# Patient Record
Sex: MALE | Race: ASIAN | NOT HISPANIC OR LATINO | Employment: STUDENT | URBAN - METROPOLITAN AREA
[De-identification: names, ages, dates, MRNs, and addresses within clinical notes are randomized per-mention and may not be internally consistent; named-entity substitution may affect disease eponyms.]

---
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
## 2018-01-24 ENCOUNTER — HOSPITAL ENCOUNTER (OUTPATIENT)
Dept: RADIOLOGY | Facility: HOSPITAL | Age: 18
Discharge: HOME | End: 2018-01-24
Attending: ORTHOPAEDIC SURGERY
Payer: MEDICAID

## 2018-01-24 ENCOUNTER — CLINICAL SUPPORT (OUTPATIENT)
Dept: REHABILITATION | Facility: HOSPITAL | Age: 18
End: 2018-01-24
Attending: PHYSICIAN ASSISTANT
Payer: COMMERCIAL

## 2018-01-24 ENCOUNTER — OFFICE VISIT (OUTPATIENT)
Dept: SPORTS MEDICINE | Facility: CLINIC | Age: 18
End: 2018-01-24
Payer: MEDICAID

## 2018-01-24 VITALS
WEIGHT: 140 LBS | HEIGHT: 62 IN | HEART RATE: 95 BPM | DIASTOLIC BLOOD PRESSURE: 95 MMHG | BODY MASS INDEX: 25.76 KG/M2 | SYSTOLIC BLOOD PRESSURE: 149 MMHG

## 2018-01-24 DIAGNOSIS — Z98.890 STATUS POST ARTHROSCOPY OF HIP: ICD-10-CM

## 2018-01-24 DIAGNOSIS — Z98.890 STATUS POST ARTHROSCOPY OF HIP: Primary | ICD-10-CM

## 2018-01-24 DIAGNOSIS — R29.898 WEAKNESS OF HIP, UNSPECIFIED LATERALITY: ICD-10-CM

## 2018-01-24 DIAGNOSIS — M25.551 RIGHT HIP PAIN: ICD-10-CM

## 2018-01-24 PROCEDURE — 73503 X-RAY EXAM HIP UNI 4/> VIEWS: CPT | Mod: TC,PO,LT

## 2018-01-24 PROCEDURE — 99213 OFFICE O/P EST LOW 20 MIN: CPT | Mod: PBBFAC,25,PO | Performed by: ORTHOPAEDIC SURGERY

## 2018-01-24 PROCEDURE — 99999 PR PBB SHADOW E&M-EST. PATIENT-LVL III: CPT | Mod: PBBFAC,,, | Performed by: ORTHOPAEDIC SURGERY

## 2018-01-24 PROCEDURE — 97110 THERAPEUTIC EXERCISES: CPT

## 2018-01-24 PROCEDURE — 99024 POSTOP FOLLOW-UP VISIT: CPT | Mod: ,,, | Performed by: ORTHOPAEDIC SURGERY

## 2018-01-24 PROCEDURE — 97140 MANUAL THERAPY 1/> REGIONS: CPT

## 2018-01-24 PROCEDURE — 73503 X-RAY EXAM HIP UNI 4/> VIEWS: CPT | Mod: 26,LT,, | Performed by: RADIOLOGY

## 2018-01-24 NOTE — PROGRESS NOTES
CC Right hip pain    HISTORY OF PRESENT ILLNESS:   Pt is here today for post-operative followup of his hip arthroscopy.  he is doing well.  We have reviewed his findings and discussed plan of care and future treatment options.    Pain today 2/10  He has been attending physical therapy at the [facility] location with Patti               He went on vacation for 2.5 weeks and missed Therapy and was limping when he returned   His mother notes him walking with a limp at times   He notes that he has been doing his HEP every other day     DATE OF PROCEDURE: 10/26/2017  PROCEDURE:    Right:  1. Hip arthroscopic labral debridement (CPT 64794)  2. Hip arthroscopic femoral neck osteoplasty (CPT 04976)  3. Hip arthroscopic partial synovectomy/debridement (CPT 56259)  4. Hip arthroscopic chondroplasty (CPT 13367)  5. Hip arthroscopic capsular closure  6. Hip arthroscopic-assisted arthrocentesis of viable structural human allograft tissue      matrix (BioDRestore, supralabral recess) (CPT 32911)  7. 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                                                                     PHYSICAL EXAMINATION:     Incision sites healed well  No evidence of any erythema, infection or induration  Flexion ROM 0-95 degrees   IR without axial load:  30  IR with axial load: 25  ER: 35  abd: 35  Add: 15  Minimal effusion  2+ DP pulse  No swelling, no calf tenderness    + tenderness, hip flexor and greater troch                                                                                ASSESSMENT:                                                                                                                                                1. Status post above, doing well.                                                                                                                               PLAN:                                                                                                                                                     1. Continue with PT  2. Emphasized core function.  3. I have discussed return to activity in detail.  4. he will see us back in 3 months with hip form.  5. All questions were answered and he should contact us if he  has any questions or concerns in the interim.

## 2018-01-24 NOTE — PROGRESS NOTES
Physical Therapy Daily Note     Date: 01/24/2018  Name: Tito Stephens  Clinic Number: [id number]  Diagnosis:   Encounter Diagnoses   Name Primary?    Right hip pain     Weakness of hip, unspecified laterality      Physician: David Lew III, *    Evaluation Date: 10/27  Date of Surgery: 10/26  Post op day 98  Return to MD Date:   Visit #: 8  Start Time:  205  Stop Time:  300  Total Treatment Time: 55     Precautions: hip labral repair  Subjective     Pt has not been to PT in 1 month. He was out of town. Pt saw MD and MD would like additional strengthening and stretching  Pain: 3/10    Objective     Measurements taken:     Tito was instructed in and performed therapeutic exercises to develop strength, endurance, ROM, flexibility, posture and core stabilization for 30  minutes including:     Clamshells 3 x 10  kickouts 3 x 10  SL bridges 3 x 10  Walkout x 2 laps   Wall sit 3:30 sec    NP  Side bridge 3 x 10   SKTC 5:30sec B    Tito received the following manual therapy techniques: Joint mobilizations and Soft tissue Mobilization were applied to the: for 10 minutes.   OP hip flexor stretch   LLD  Lateral distraction with belt  PROM with mobilization  Flex  IR  abd  Flex + IR    Pt received CP to R hip x 10 min    Written Home Exercises Provided: Yes  Pt demo good understanding of the education provided. Tito demonstrated good return demonstration of activities.     Education provided:  Tito verbalized good understanding of education provided.   No spiritual or educational barriers to learning identified.    Assessment     Pt demonstrated tightness with PROM, but reports that this is significantly better than pre-surgery ROM. Pt will benefit from cont hip and quad strengthening in addition to stretching global hip musculature    This is a 17 y.o. male referred to outpatient physical therapy and presents with a medical diagnosis of s/p hip l abral  repair and demonstrates limitations as described in the problem list Pt prognosis is Excellent. Pt will continue to benefit from skilled outpatient physical therapy to address the deficits listed below in the problem list, provide pt/family education and to maximize pt's level of independence in the home and community environment.    Will the patient continue to benefit from skilled PT intervention? Yes        Medical necessity is demonstrated by:   - Pain limits function of effected part for all activities  - Unable to participate in daily activities   - Requires skilled supervision to complete and progress HEP  - Fall risk - impaired balance   - Continued inability to participate in vocational pursuits    Progress towards goals:     New/Revised Goals:       Plan   Continue with established Plan of Care towards PT goals.      Aissatou Madison, PT, DPT  01/24/2018

## 2018-02-01 ENCOUNTER — CLINICAL SUPPORT (OUTPATIENT)
Dept: REHABILITATION | Facility: HOSPITAL | Age: 18
End: 2018-02-01
Attending: PHYSICIAN ASSISTANT
Payer: COMMERCIAL

## 2018-02-01 DIAGNOSIS — M25.551 RIGHT HIP PAIN: ICD-10-CM

## 2018-02-01 DIAGNOSIS — R29.898 WEAKNESS OF HIP, UNSPECIFIED LATERALITY: ICD-10-CM

## 2018-02-01 PROCEDURE — 97140 MANUAL THERAPY 1/> REGIONS: CPT

## 2018-02-01 PROCEDURE — 97110 THERAPEUTIC EXERCISES: CPT

## 2018-02-01 NOTE — PROGRESS NOTES
"                                                  Physical Therapy Daily Note     Date: 02/01/2018  Name: Tito Stephens  Clinic Number: [id number]  Diagnosis:   Encounter Diagnoses   Name Primary?    Right hip pain     Weakness of hip, unspecified laterality      Physician: David Lew III, *    Evaluation Date: 10/27  Date of Surgery: 10/26  Post op day 98  Return to MD Date:   Visit #: 9  Start Time: 3:30  Stop Time:  4:35   Total Treatment Time: 65     Precautions: hip labral repair  Subjective     Pt compliant with HEP, states increased stiffness today.   Pain: 1/10    Objective     Measurements taken:     Tito was instructed in and performed therapeutic exercises to develop strength, endurance, ROM, flexibility, posture and core stabilization for 55  minutes including:     Clamshells BTB x15, x15 with hip ext knee bent  kickouts 3 x 10 BTB  SL bridges 3 x 10 NT  Walkout x 2 laps BTB  Wall sit 3:30 sec  R hip flex stretch EOM 3x30"  Piriformis Stretch 3x30"  Hamstring Stretch Standing foot on step 3x30"  Bridge on SB 2x10       With Ham Curl 2x10  Shuttle DL 80# 3x10  Shuttle SL 60# 3x10  Quad Alt LE/UE 3x10     NP  Side bridge 3 x 10   SKTC 5:30sec B    Tito received the following manual therapy techniques: Joint mobilizations and Soft tissue Mobilization were applied to the: for 10 minutes.   OP hip flexor stretch   LLD  Lateral distraction with belt  PROM with mobilization  Flex  IR  abd  Flex + IR    Pt received CP to R hip x 10 min    Written Home Exercises Provided: Yes  Pt demo good understanding of the education provided. Tito demonstrated good return demonstration of activities.     Education provided:  Tito verbalized good understanding of education provided.   No spiritual or educational barriers to learning identified.    Assessment     Pt tolerated tx with moderate fatigue by end of tx, primarily in glut and hamstrings. Pt with improved hip mobility after manual tx - noted " primary limitations in Hip Ext and IR . Will continue to progress quad and hip strengthening as tolerated.      This is a 17 y.o. male referred to outpatient physical therapy and presents with a medical diagnosis of s/p hip l abral repair and demonstrates limitations as described in the problem list Pt prognosis is Excellent. Pt will continue to benefit from skilled outpatient physical therapy to address the deficits listed below in the problem list, provide pt/family education and to maximize pt's level of independence in the home and community environment.    Will the patient continue to benefit from skilled PT intervention? Yes        Medical necessity is demonstrated by:   - Pain limits function of effected part for all activities  - Unable to participate in daily activities   - Requires skilled supervision to complete and progress HEP  - Fall risk - impaired balance   - Continued inability to participate in vocational pursuits    Progress towards goals:     New/Revised Goals:       Plan   Continue with established Plan of Care towards PT goals.      Aissatou Madison, PT, DPT  02/01/2018

## 2018-02-08 ENCOUNTER — CLINICAL SUPPORT (OUTPATIENT)
Dept: REHABILITATION | Facility: HOSPITAL | Age: 18
End: 2018-02-08
Attending: PHYSICIAN ASSISTANT
Payer: COMMERCIAL

## 2018-02-08 DIAGNOSIS — M25.551 RIGHT HIP PAIN: ICD-10-CM

## 2018-02-08 DIAGNOSIS — R29.898 WEAKNESS OF HIP, UNSPECIFIED LATERALITY: ICD-10-CM

## 2018-02-08 PROCEDURE — 97110 THERAPEUTIC EXERCISES: CPT

## 2018-02-08 NOTE — PROGRESS NOTES
"                                                  Physical Therapy Daily Note     Date: 02/08/2018  Name: Tito Stephens  Clinic Number: [id number]  Diagnosis:   No diagnosis found.  Physician: David Lew III, *    Evaluation Date: 10/27  Date of Surgery: 10/26  Post op day 98  Return to MD Date:   Visit #: 9  Start Time: 3:30  Stop Time:  4:35   Total Treatment Time: 65     Precautions: hip labral repair  Subjective     Pt compliant with HEP, states increased stiffness today.   Pain: 1/10    Objective     Measurements taken:     Tito was instructed in and performed therapeutic exercises to develop strength, endurance, ROM, flexibility, posture and core stabilization for 55  minutes including:     Clamshells BTB x15, x15 with hip ext knee bent  kickouts 3 x 10 BTB  SL bridges 3 x 10 NT  Walkout x 2 laps BTB  Wall sit 3:30 sec  R hip flex stretch EOM 3x30"  Piriformis Stretch 3x30"  Hamstring Stretch Standing foot on step 3x30"  Bridge on SB 2x10       With Ham Curl 2x10  Shuttle DL 80# 3x10  Shuttle SL 60# 3x10  Quad Alt LE/UE 3x10     NP  Side bridge 3 x 10   SKTC 5:30sec B    Tito received the following manual therapy techniques: Joint mobilizations and Soft tissue Mobilization were applied to the: for 10 minutes.   OP hip flexor stretch   LLD  Lateral distraction with belt  PROM with mobilization  Flex  IR  abd  Flex + IR    Pt received CP to R hip x 10 min    Written Home Exercises Provided: Yes  Pt demo good understanding of the education provided. Tito demonstrated good return demonstration of activities.     Education provided:  Tito verbalized good understanding of education provided.   No spiritual or educational barriers to learning identified.    Assessment     Pt tolerated tx with moderate fatigue by end of tx, primarily in glut and hamstrings. Pt with improved hip mobility after manual tx - noted primary limitations in Hip Ext and IR . Will continue to progress quad and hip " strengthening as tolerated.      This is a 17 y.o. male referred to outpatient physical therapy and presents with a medical diagnosis of s/p hip l abral repair and demonstrates limitations as described in the problem list Pt prognosis is Excellent. Pt will continue to benefit from skilled outpatient physical therapy to address the deficits listed below in the problem list, provide pt/family education and to maximize pt's level of independence in the home and community environment.    Will the patient continue to benefit from skilled PT intervention? Yes        Medical necessity is demonstrated by:   - Pain limits function of effected part for all activities  - Unable to participate in daily activities   - Requires skilled supervision to complete and progress HEP  - Fall risk - impaired balance   - Continued inability to participate in vocational pursuits    Progress towards goals:     New/Revised Goals:       Plan   Continue with established Plan of Care towards PT goals.      Aissatou Madison, PT, DPT  02/08/2018

## 2018-02-08 NOTE — PROGRESS NOTES
"                                                  Physical Therapy Daily Note     Date: 02/08/2018  Name: Tito Stephens  Clinic Number: [id number]  Diagnosis:   Encounter Diagnoses   Name Primary?    Right hip pain     Weakness of hip, unspecified laterality      Physician: David Lew III, *    Evaluation Date: 10/27  Date of Surgery: 10/26  Post op day 98  Return to MD Date:   Visit #: 9  Start Time: 300  Stop Time:  4:00  Total Treatment Time: 60     Precautions: hip labral repair  Subjective     Pt reporting stiffness in the R hip but has no pain. .   Pain: 1/10    Objective     Measurements taken:     Tito was instructed in and performed therapeutic exercises to develop strength, endurance, ROM, flexibility, posture and core stabilization for 55  minutes including:     Clamshells BTB x15, x15 with hip ext knee bent  kickouts 3 x 10 BTB  SL bridges 3 x 10   Wall sit 3:30 sec  R hip flex stretch EOM 4 min  Piriformis Stretch 3x30"  Hamstring Stretch Standing foot on step 3x30"  Bridge on SB 2x10       With Ham Curl 2x10  Quad Alt LE/UE on stabaility ball 3x10   Squat rockers 15# x 30      NP  Side bridge 3 x 10   SKTC 5:30sec B    Tito received the following manual therapy techniques: Joint mobilizations and Soft tissue Mobilization were applied to the: for 10 minutes.   OP hip flexor stretch   LLD  Lateral distraction with belt  PROM with mobilization  Flex  IR  abd  Flex + IR    Pt received CP to R hip x 10 min- NP    Written Home Exercises Provided: Yes  Pt demo good understanding of the education provided. Tito demonstrated good return demonstration of activities.     Education provided:  Tito verbalized good understanding of education provided.   No spiritual or educational barriers to learning identified.    Assessment     Pt did well with therapy today noting fatigue with exercise activity. Appeared to have a really good response to a side lying anterior fascial chain stretch " allowing improved hip extension. Gross hip mobility remains stiff but does improve throughout the tx session.     This is a 17 y.o. male referred to outpatient physical therapy and presents with a medical diagnosis of s/p hip l abral repair and demonstrates limitations as described in the problem list Pt prognosis is Excellent. Pt will continue to benefit from skilled outpatient physical therapy to address the deficits listed below in the problem list, provide pt/family education and to maximize pt's level of independence in the home and community environment.    Will the patient continue to benefit from skilled PT intervention? Yes        Medical necessity is demonstrated by:   - Pain limits function of effected part for all activities  - Unable to participate in daily activities   - Requires skilled supervision to complete and progress HEP  - Fall risk - impaired balance   - Continued inability to participate in vocational pursuits    Progress towards goals:     New/Revised Goals:       Plan   Continue with established Plan of Care towards PT goals.      Speedy Anderson, PT , DPT, OCS  02/08/2018

## 2018-02-14 ENCOUNTER — CLINICAL SUPPORT (OUTPATIENT)
Dept: REHABILITATION | Facility: HOSPITAL | Age: 18
End: 2018-02-14
Attending: PHYSICIAN ASSISTANT
Payer: COMMERCIAL

## 2018-02-14 DIAGNOSIS — R29.898 WEAKNESS OF HIP, UNSPECIFIED LATERALITY: ICD-10-CM

## 2018-02-14 DIAGNOSIS — M25.551 RIGHT HIP PAIN: ICD-10-CM

## 2018-02-14 PROCEDURE — 97110 THERAPEUTIC EXERCISES: CPT

## 2018-02-14 NOTE — PROGRESS NOTES
"                                                  Physical Therapy Daily Note     Date: 02/14/2018  Name: Tito Stephens  Clinic Number: [id number]  Diagnosis:   Encounter Diagnoses   Name Primary?    Right hip pain     Weakness of hip, unspecified laterality      Physician: David Lew III, *    Evaluation Date: 10/27  Date of Surgery: 10/26  Post op day 105  Return to MD Date:   Visit #: 10  Start Time: 1120  Stop Time:  1210  Total Treatment Time: 50     Precautions: hip labral repair  Subjective     Pt reports that his hip cont to feel tight, but he denies pain. He reports that he feels it has improved overall.   Pain: 1/10    Objective     Measurements taken:     Tito was instructed in and performed therapeutic exercises to develop strength, endurance, ROM, flexibility, posture and core stabilization for 55  minutes including:     Clamshells BTB x15, x15 with hip ext knee bent  kickouts 3 x 10 BTB  SL bridges 3 x 10   Wall sit 3:30 sec  Piriformis Stretch 3x30"  Hamstring Stretch Standing foot on step 3x30"  EOT lunges 3 x 10 per side  Leg press 3 x 10 DL, SL        Tito received the following manual therapy techniques: Joint mobilizations and Soft tissue Mobilization were applied to the: for 10 minutes.   OP hip flexor stretch   LLD  Lateral distraction with belt  PROM with mobilization  RYAN    Written Home Exercises Provided: Yes  Pt demo good understanding of the education provided. Tito demonstrated good return demonstration of activities.     Education provided:  Tito verbalized good understanding of education provided.   No spiritual or educational barriers to learning identified.    Assessment     Pt cont with stiffness, but had improved ROM following tx.  Improving global hip strength and function. Cont to progress mobility and functional strengthening.     This is a 17 y.o. male referred to outpatient physical therapy and presents with a medical diagnosis of s/p hip l abral " repair and demonstrates limitations as described in the problem list Pt prognosis is Excellent. Pt will continue to benefit from skilled outpatient physical therapy to address the deficits listed below in the problem list, provide pt/family education and to maximize pt's level of independence in the home and community environment.    Will the patient continue to benefit from skilled PT intervention? Yes        Medical necessity is demonstrated by:   - Pain limits function of effected part for all activities  - Unable to participate in daily activities   - Requires skilled supervision to complete and progress HEP  - Fall risk - impaired balance   - Continued inability to participate in vocational pursuits    Progress towards goals:     New/Revised Goals:       Plan   Continue with established Plan of Care towards PT goals.    Aissatou Madison, PT, DPT  02/14/2018

## 2018-02-22 ENCOUNTER — CLINICAL SUPPORT (OUTPATIENT)
Dept: REHABILITATION | Facility: HOSPITAL | Age: 18
End: 2018-02-22
Attending: PHYSICIAN ASSISTANT
Payer: COMMERCIAL

## 2018-02-22 DIAGNOSIS — M25.551 RIGHT HIP PAIN: ICD-10-CM

## 2018-02-22 DIAGNOSIS — R29.898 WEAKNESS OF RIGHT HIP: ICD-10-CM

## 2018-02-22 PROCEDURE — 97110 THERAPEUTIC EXERCISES: CPT

## 2018-02-22 PROCEDURE — 97140 MANUAL THERAPY 1/> REGIONS: CPT

## 2018-02-27 ENCOUNTER — CLINICAL SUPPORT (OUTPATIENT)
Dept: REHABILITATION | Facility: HOSPITAL | Age: 18
End: 2018-02-27
Attending: PHYSICIAN ASSISTANT
Payer: COMMERCIAL

## 2018-02-27 DIAGNOSIS — R29.898 WEAKNESS OF HIP, UNSPECIFIED LATERALITY: ICD-10-CM

## 2018-02-27 DIAGNOSIS — M25.551 RIGHT HIP PAIN: ICD-10-CM

## 2018-02-27 PROCEDURE — 97110 THERAPEUTIC EXERCISES: CPT

## 2018-02-27 PROCEDURE — 97140 MANUAL THERAPY 1/> REGIONS: CPT

## 2018-02-27 NOTE — PROGRESS NOTES
"                                                  Physical Therapy Daily Note     Date: 02/27/2018  Name: Tito Stephens  Clinic Number: [id number]  Diagnosis:   Encounter Diagnoses   Name Primary?    Right hip pain     Weakness of right hip      Physician: David Lew III, *    Evaluation Date: 10/27  Date of Surgery: 10/26  Post op day 105  Return to MD Date:   Visit #: 10   Precautions: hip labral repair  Subjective     Pt reports feeling fine without pain today.     Objective     Measurements taken:     Tito was instructed in and performed therapeutic exercises to develop strength, endurance, ROM, flexibility, posture and core stabilization including:     Clamshells BTB x15, x15 with hip ext knee bent  kickouts 3 x 10 BTB  SL bridges 3 x 10   Wall sit 3:30 sec  Piriformis Stretch 3x30"  Hamstring Stretch Standing foot on step 3x30"  EOT lunges 3 x 10 per side  Leg press 3 x 10 DL, SL    Tito received the following manual therapy techniques: Joint mobilizations and Soft tissue Mobilization were applied to the: for 10 minutes.   OP hip flexor stretch   LLD  Lateral distraction with belt  PROM with mobilization  RYAN    Written Home Exercises Provided: Yes  Pt demo good understanding of the education provided. Tito demonstrated good return demonstration of activities.     Education provided:  Tito verbalized good understanding of education provided.   No spiritual or educational barriers to learning identified.    Assessment     Pt cont with stiffness, but had improved ROM following tx.  Improving global hip strength and function. Cont to progress mobility and functional strengthening.   This is a 17 y.o. male referred to outpatient physical therapy and presents with a medical diagnosis of s/p hip l abral repair and demonstrates limitations as described in the problem list Pt prognosis is Excellent. Pt will continue to benefit from skilled outpatient physical therapy to address the deficits " listed below in the problem list, provide pt/family education and to maximize pt's level of independence in the home and community environment.    Will the patient continue to benefit from skilled PT intervention? Yes        Medical necessity is demonstrated by:   - Pain limits function of effected part for all activities  - Unable to participate in daily activities   - Requires skilled supervision to complete and progress HEP  - Fall risk - impaired balance   - Continued inability to participate in vocational pursuits    Progress towards goals:     New/Revised Goals:       Plan   Continue with established Plan of Care towards PT goals. PT/PTA face-to-fac  Lawanda Hollins, PTA  Aissatou Madison, PT, DPT

## 2018-02-27 NOTE — PROGRESS NOTES
"                                                  Physical Therapy Daily Note     Date: 02/27/2018  Name: Tito Stephens  Clinic Number: [id number]  Diagnosis:   Encounter Diagnoses   Name Primary?    Right hip pain     Weakness of hip, unspecified laterality      Physician: David Lew III, *    Evaluation Date: 10/27  Date of Surgery: 10/26  Post op day 105  Return to MD Date:   Visit #: 11  Start Time: 1110  Stop Time:  1200  Total Treatment Time: 50     Precautions: hip labral repair  Subjective     Pt reports that his hip cont to feel tight, but he denies pain. He reports that he feels it has improved overall.   Pain: 1/10    Objective     Measurements taken:     Tito was instructed in and performed therapeutic exercises to develop strength, endurance, ROM, flexibility, posture and core stabilization for 40  minutes including:     Elliptical 6'  Clamshells with active hip extension, knee bent OTB 3x10   kickouts 3 x 10 BTB  SL bridges 3 x 10   Wall sit 3:30 sec  Piriformis Stretch 3x30"  Hamstring Stretch Standing foot on step 3x30"  BOSU lunges 3 x 10 per side  Leg press 3 x 10 #, SL 70#  Bridge on SB with ham curl 3x10    Tito received the following manual therapy techniques: Joint mobilizations and Soft tissue Mobilization were applied to the: for 10 minutes.   Hip mobilization with belt  PROM     Written Home Exercises Provided: Yes  Pt demo good understanding of the education provided. Tito demonstrated good return demonstration of activities.     Education provided:  Tito verbalized good understanding of education provided.   No spiritual or educational barriers to learning identified.    Assessment     Pt cont with stiffness, but had improved ROM following tx.  Improving global hip strength and function. Cont to progress mobility and functional strengthening.     This is a 17 y.o. male referred to outpatient physical therapy and presents with a medical diagnosis of s/p hip l " abral repair and demonstrates limitations as described in the problem list Pt prognosis is Excellent. Pt will continue to benefit from skilled outpatient physical therapy to address the deficits listed below in the problem list, provide pt/family education and to maximize pt's level of independence in the home and community environment.    Will the patient continue to benefit from skilled PT intervention? Yes        Medical necessity is demonstrated by:   - Pain limits function of effected part for all activities  - Unable to participate in daily activities   - Requires skilled supervision to complete and progress HEP  - Fall risk - impaired balance   - Continued inability to participate in vocational pursuits    Progress towards goals:     New/Revised Goals:       Plan   Continue with established Plan of Care towards PT goals.    Aissatou Madison, PT, DPT  02/27/2018

## 2018-03-08 ENCOUNTER — CLINICAL SUPPORT (OUTPATIENT)
Dept: REHABILITATION | Facility: HOSPITAL | Age: 18
End: 2018-03-08
Attending: PHYSICIAN ASSISTANT
Payer: COMMERCIAL

## 2018-03-08 DIAGNOSIS — M25.551 RIGHT HIP PAIN: ICD-10-CM

## 2018-03-08 DIAGNOSIS — R29.898 WEAKNESS OF RIGHT HIP: ICD-10-CM

## 2018-03-08 PROCEDURE — 97140 MANUAL THERAPY 1/> REGIONS: CPT

## 2018-03-08 PROCEDURE — 97110 THERAPEUTIC EXERCISES: CPT

## 2018-03-08 NOTE — PROGRESS NOTES
"                                                  Physical Therapy Daily Note     Date: 03/11/2018  Name: Tito Stephens  Clinic Number: [id number]  Diagnosis:   Encounter Diagnoses   Name Primary?    Right hip pain     Weakness of right hip      Physician: David Lew III, *    Evaluation Date: 10/27  Date of Surgery: 10/26  Post op day 105  Return to MD Date:   Visit #: 14  Start Time: 3:05 p  Stop Time: 4:00 p  Total Treatment Time: 55     Precautions: hip labral repair  Subjective     Pt reports that his hip cont to feel tight, but he denies pain. He reports that he feels it has improved overall.   Pain: 1/10    Objective     Measurements taken:     Tito was instructed in and performed therapeutic exercises to develop strength, endurance, ROM, flexibility, posture and core stabilization for 40  minutes including:     Elliptical 6'  Clamshells with active hip extension, knee bent OTB 3x10   kickouts 3 x 10 BTB NT  SL bridges 3 x 10 BTB  Wall sit 3:30 sec NT  Piriformis Stretch 3x30"  Hamstring Stretch Standing foot on step 3x30"  BOSU lunges 3 x 10 per side  Leg press 3 x 10 #, SL 70# NT  Bridge on SB with ham curl 3x10  Pilates Box 2x10 R  SLS Rebounder 3x10 on blue foam pad    Tito received the following manual therapy techniques: Joint mobilizations and Soft tissue Mobilization were applied to the: for 10 minutes.   Hip mobilization with belt  PROM     Written Home Exercises Provided: Yes  Pt demo good understanding of the education provided. Tito demonstrated good return demonstration of activities.     Education provided:  Tito verbalized good understanding of education provided.   No spiritual or educational barriers to learning identified.    Assessment     Pt presents tired and not motivated today prior to starting therapy. Removed leg press and wall sits today due to increased fatigue. Pt with improvement in PROM - still noted restrictions but overall WFL. Pt strength is " still limited on R side, will address POC next visit.     This is a 17 y.o. male referred to outpatient physical therapy and presents with a medical diagnosis of s/p hip l abral repair and demonstrates limitations as described in the problem list Pt prognosis is Excellent. Pt will continue to benefit from skilled outpatient physical therapy to address the deficits listed below in the problem list, provide pt/family education and to maximize pt's level of independence in the home and community environment.    Will the patient continue to benefit from skilled PT intervention? Yes        Medical necessity is demonstrated by:   - Pain limits function of effected part for all activities  - Unable to participate in daily activities   - Requires skilled supervision to complete and progress HEP  - Fall risk - impaired balance   - Continued inability to participate in vocational pursuits    Progress towards goals:     New/Revised Goals:       Plan   Continue with established Plan of Care towards PT goals.    Aissatou Madison, PT, DPT  03/11/2018

## 2018-04-04 ENCOUNTER — CLINICAL SUPPORT (OUTPATIENT)
Dept: REHABILITATION | Facility: HOSPITAL | Age: 18
End: 2018-04-04
Attending: PHYSICIAN ASSISTANT
Payer: COMMERCIAL

## 2018-04-04 DIAGNOSIS — R29.898 WEAKNESS OF RIGHT HIP: ICD-10-CM

## 2018-04-04 DIAGNOSIS — M25.551 RIGHT HIP PAIN: ICD-10-CM

## 2018-04-04 PROCEDURE — 97110 THERAPEUTIC EXERCISES: CPT

## 2018-04-04 NOTE — PROGRESS NOTES
"                                                  Physical Therapy Daily Note     Date: 04/04/2018  Name: Tito Stephens  Clinic Number: [id number]  Diagnosis:   Encounter Diagnoses   Name Primary?    Right hip pain     Weakness of right hip      Physician: David Lew III, *    Evaluation Date: 10/27  Date of Surgery: 10/26  Return to MD Date:   Visit #: 15  Start Time: 700  Stop Time: 730  Total Treatment Time: 30     Precautions: hip labral repair  Subjective     Pt has not attended PT in 1 month. Inconsistent with appointments and has not had continuous therapy.   Pt has flight this AM and can only stay for 30 min.  Pain: 0/10    Objective     Measurements taken:     Hip abd 3/5  Hip flex 3/5  Hip flexx ROM 95    Tito was instructed in and performed therapeutic exercises to develop strength, endurance, ROM, flexibility, posture and core stabilization for 30  minutes including:       Clamshells with active hip extension, knee bent OTB 3x10   kickouts 3 x 10 BTB NT  SL bridges 3 x 10 BTB  Prone quad stretch 5:30 sec per side      NP  Wall sit 3:30 sec NT  Piriformis Stretch 3x30"  Hamstring Stretch Standing foot on step 3x30"  BOSU lunges 3 x 10 per side  Leg press 3 x 10 #, SL 70# NT  Bridge on SB with ham curl 3x10  Pilates Box 2x10 R  SLS Rebounder 3x10 on blue foam pad    Tito received the following manual therapy techniques: Joint mobilizations and Soft tissue Mobilization were applied to the: for 0 minutes.   Hip mobilization with belt  PROM     Written Home Exercises Provided: Yes  Pt demo good understanding of the education provided. Tito demonstrated good return demonstration of activities.     Education provided:  Tito verbalized good understanding of education provided.   No spiritual or educational barriers to learning identified.    Assessment     Pt encouraged to increase PT visits and perform additional home program. He reports that he has not kept up with therex at " home. Pt given stretches to perform while out of town.      This is a 17 y.o. male referred to outpatient physical therapy and presents with a medical diagnosis of s/p hip l abral repair and demonstrates limitations as described in the problem list Pt prognosis is Excellent. Pt will continue to benefit from skilled outpatient physical therapy to address the deficits listed below in the problem list, provide pt/family education and to maximize pt's level of independence in the home and community environment.    Will the patient continue to benefit from skilled PT intervention? Yes        Medical necessity is demonstrated by:   - Pain limits function of effected part for all activities  - Unable to participate in daily activities   - Requires skilled supervision to complete and progress HEP  - Fall risk - impaired balance   - Continued inability to participate in vocational pursuits    Progress towards goals:     New/Revised Goals:       Plan   Continue with established Plan of Care towards PT goals.    Aissatou Madison, PT, DPT  04/04/2018

## 2018-04-20 ENCOUNTER — OFFICE VISIT (OUTPATIENT)
Dept: SPORTS MEDICINE | Facility: CLINIC | Age: 18
End: 2018-04-20
Payer: COMMERCIAL

## 2018-04-20 ENCOUNTER — HOSPITAL ENCOUNTER (OUTPATIENT)
Dept: RADIOLOGY | Facility: HOSPITAL | Age: 18
Discharge: HOME | End: 2018-04-20
Attending: ORTHOPAEDIC SURGERY
Payer: COMMERCIAL

## 2018-04-20 VITALS
HEART RATE: 83 BPM | DIASTOLIC BLOOD PRESSURE: 96 MMHG | BODY MASS INDEX: 25.76 KG/M2 | SYSTOLIC BLOOD PRESSURE: 151 MMHG | WEIGHT: 140 LBS | HEIGHT: 62 IN

## 2018-04-20 DIAGNOSIS — M25.561 ACUTE PAIN OF BOTH KNEES: Primary | ICD-10-CM

## 2018-04-20 DIAGNOSIS — M25.561 ACUTE PAIN OF BOTH KNEES: ICD-10-CM

## 2018-04-20 DIAGNOSIS — M25.562 ACUTE PAIN OF BOTH KNEES: Primary | ICD-10-CM

## 2018-04-20 DIAGNOSIS — M25.562 ACUTE PAIN OF BOTH KNEES: ICD-10-CM

## 2018-04-20 PROCEDURE — 99213 OFFICE O/P EST LOW 20 MIN: CPT | Mod: PBBFAC,25,PO | Performed by: ORTHOPAEDIC SURGERY

## 2018-04-20 PROCEDURE — 99999 PR PBB SHADOW E&M-EST. PATIENT-LVL III: CPT | Mod: PBBFAC,,, | Performed by: ORTHOPAEDIC SURGERY

## 2018-04-20 PROCEDURE — 73564 X-RAY EXAM KNEE 4 OR MORE: CPT | Mod: TC,50,FY,PO

## 2018-04-20 PROCEDURE — 99214 OFFICE O/P EST MOD 30 MIN: CPT | Mod: S$GLB,,, | Performed by: ORTHOPAEDIC SURGERY

## 2018-04-20 PROCEDURE — 73564 X-RAY EXAM KNEE 4 OR MORE: CPT | Mod: 26,50,, | Performed by: RADIOLOGY

## 2018-04-20 NOTE — PROGRESS NOTES
CC Right hip pain    HISTORY OF PRESENT ILLNESS:   Pt is here today for post-operative followup of his hip arthroscopy.  he is doing well.  We have reviewed his findings and discussed plan of care and future treatment options.    Pain today 1/10    He has been attending physical therapy at the [facility] location with Patti once a week.              Pt reports bilateral knee pain located anteriorly worse with extended periods of sitting and ascending stairs.    Pt is student at [employer].    DATE OF PROCEDURE: 10/26/2017  PROCEDURE:    Right:  1. Hip arthroscopic labral debridement (CPT 79759)  2. Hip arthroscopic femoral neck osteoplasty (CPT 03816)  3. Hip arthroscopic partial synovectomy/debridement (CPT 97470)  4. Hip arthroscopic chondroplasty (CPT 81823)  5. Hip arthroscopic capsular closure  6. Hip arthroscopic-assisted arthrocentesis of viable structural human allograft tissue      matrix (BioDRestore, supralabral recess) (CPT 06074)  7. Hip fluoroscopic guidance for needle placement/localization (CPT 89609)       There was evidence of chondral lesions to the: acetabulum at  1-3:00 position [id number] grade 3, chondroplasty was performed at site of chondromalacia 1mm under labral tear (was incorporated into area of acetabuloplasty) chondroplasty was performed at site of chondromalacia with shaver and thermal device.      PAST MEDICAL HISTORY:   Past Medical History:   Diagnosis Date    Asthma     as a child     PAST SURGICAL HISTORY:   Past Surgical History:   Procedure Laterality Date    HIP SURGERY      WISDOM TOOTH EXTRACTION       FAMILY HISTORY:   Family History   Problem Relation Age of Onset    No Known Problems Mother     No Known Problems Father     No Known Problems Sister     No Known Problems Brother      SOCIAL HISTORY:   Social History     Social History    Marital status: Single     Spouse name: N/A    Number of children: N/A    Years of education: N/A     Occupational History     "Not on file.     Social History Main Topics    Smoking status: Never Smoker    Smokeless tobacco: Never Used    Alcohol use No    Drug use: No    Sexual activity: Not on file     Other Topics Concern    Not on file     Social History Narrative    No narrative on file       MEDICATIONS:   Current Outpatient Prescriptions:     hydrocodone-acetaminophen 7.5-325mg (NORCO) 7.5-325 mg per tablet, Take 1 tablet by mouth every 4 to 6 hours as needed for Pain., Disp: 30 tablet, Rfl: 0    indomethacin (INDOCIN) 25 MG capsule, Take 3 capsules (75 mg total) by mouth once daily. Take with food. Take on post-op days 1,2,3,&4., Disp: 12 capsule, Rfl: 0    promethazine (PHENERGAN) 25 MG tablet, Take 1 tablet (25 mg total) by mouth every 6 (six) hours as needed for Nausea., Disp: 16 tablet, Rfl: 0    tramadol (ULTRAM) 50 mg tablet, Take 1-2 tablets ( mg total) by mouth every 6 (six) hours as needed (surgical pain)., Disp: 60 tablet, Rfl: 0    traMADol (ULTRAM) 50 mg tablet, 1-2 tablets as needed every 4-6 hours, Disp: 60 tablet, Rfl: 0    omeprazole (PRILOSEC) 40 MG capsule, Take 1 capsule (40 mg total) by mouth once daily. To protect your stomach., Disp: 25 capsule, Rfl: 0  ALLERGIES: Review of patient's allergies indicates:  No Known Allergies    VITAL SIGNS: BP (!) 151/96   Pulse 83   Ht 5' 2" (1.575 m)   Wt 63.5 kg (140 lb)   BMI 25.61 kg/m²                                                                         PHYSICAL EXAMINATION:     Incision sites healed well  No evidence of any erythema, infection or induration  Flexion ROM 0-105 degrees   IR without axial load:  30  IR with axial load: 25  ER: 35  abd: 35  Add: 15  Minimal effusion  2+ DP pulse  No swelling, no calf tenderness    + tenderness, hip flexor and greater troch       PHYSICAL EXAMINATION    General:  The patient is alert and oriented x 3.  Mood is pleasant.  Observation of ears, eyes and nose reveal no gross abnormalities.  No labored " breathing observed.    Bilateral KNEE EXAMINATION     OBSERVATION / INSPECTION   Gait:   Nonantalgic   Alignment:  Neutral   Scars:   None   Muscle atrophy: Mild  Effusion:  None   Warmth:  None   Discoloration:   none     TENDERNESS / CREPITUS (T / C):          T / C      T / C   Patella   - / -   Lateral joint line   - / -   Peripatellar medial  -  Medial joint line    - / -   Peripatellar lateral -  Medial plica   - / -   Patellar tendon -   Popliteal fossa  - / -   Quad tendon   -   Gastrocnemius   -   Prepatellar Bursa - / -   Quadricep   -   Tibial tubercle  -  Thigh/hamstring  -   Pes anserine/HS -  Fibula    -   ITB   - / -  Tibia     -   Tib/fib joint  - / -  LCL    -     MFC   - / -   MCL: Proximal  -    LFC   - / -    Distal   -          ROM: (* = pain)  PASSIVE   ACTIVE    Left :   5 / 0 / 145   5 / 0 / 145     Right :    5 / 0 / 145   5 / 0 / 145    Patellofemoral examination:  See above noted areas of tenderness.   Patella position    Subluxation / dislocation: Centered           Sup. / Inf;   Normal   Crepitus (PF):    Absent   Patellar Mobility:       Medial-lateral:   Normal    Superior-inferior:  Normal    Inferior tilt   Normal    Patellar tendon:  Normal   Lateral tilt:    Normal   J-sign:     None   Patellofemoral grind:   + pain       MENISCAL SIGNS:     Pain on terminal extension:  -  Pain on terminal flexion:  -  Robbies maneuver:  - for pain  Squat     - posterior joint pain    LIGAMENT EXAMINATION:  ACL / Lachman:  1a negative    PCL-Post.  drawer: normal 0 to 2mm  MCL- Valgus:  normal 0 to 2mm  LCL- Varus:  normal 0 to 2mm  Pivot shift: Positive shift, patient guarding during examination   Dial Test: difference c/w other side   At 30° flexion: normal (< 5°)    At 90° flexion: normal (< 5°)   Reverse Pivot Shift:   normal (Equal)     STRENGTH: (* = with pain) PAINFUL SIDE   Quadricep   5/5   Hamstrin/5    EXTREMITY NEURO-VASCULAR EXAMINATION:   Sensation:  Grossly intact to  light touch all dermatomal regions.   Motor Function:  Fully intact motor function at hip, knee, foot and ankle    DTRs;  quadriceps and  achilles 2+.  No clonus and downgoing Babinski.    Vascular status:  DP and PT pulses 2+, brisk capillary refill, symmetric.                                                                                      ASSESSMENT:                                                                                                                                               1. Status post above, doing well.     Bilateral anterior knee pain                                                                                                                        PLAN:                                                                                                                                                     1. Continue with PT  2. Emphasized core function.  3. I have discussed return to activity in detail.  4. he will see us back in 3 months with hip form.  5. All questions were answered and he should contact us if he  has any questions or concerns in the interim.

## 2018-04-26 ENCOUNTER — CLINICAL SUPPORT (OUTPATIENT)
Dept: REHABILITATION | Facility: HOSPITAL | Age: 18
End: 2018-04-26
Attending: PHYSICIAN ASSISTANT
Payer: COMMERCIAL

## 2018-04-26 DIAGNOSIS — M25.551 RIGHT HIP PAIN: ICD-10-CM

## 2018-04-26 DIAGNOSIS — R29.898 WEAKNESS OF RIGHT HIP: ICD-10-CM

## 2018-04-26 PROCEDURE — 97110 THERAPEUTIC EXERCISES: CPT

## 2018-04-26 NOTE — PROGRESS NOTES
"                                                  Physical Therapy Daily Note     Date: 04/26/2018  Name: Tito Stephens  Clinic Number: [id number]  Diagnosis:   No diagnosis found.  Physician: David Lew III, *    Evaluation Date: 10/27  Date of Surgery: 10/26  Return to MD Date:   Visit #: [id number]  Start Time: 300  Stop Time: 400  Total Treatment Time: 60     Precautions: hip labral repair  Subjective     Pt has not attended PT in 1 month - last appt 4/4/18. Inconsistent with appointments and has not had continuous therapy.     Pain: 0/10    Objective     Measurements taken:     Hip abd 3/5  Hip flex 3/5  Hip flexx ROM 95    Tito was instructed in and performed therapeutic exercises to develop strength, endurance, ROM, flexibility, posture and core stabilization for 60  minutes including:       Clamshells with active hip extension, knee bent OTB 3x10   kickouts 3 x 10 BTB NT  SL bridges 3 x 10 BTB  Prone quad stretch 5:30 sec per side  Wall sit 3:30 sec   Piriformis Stretch 3x30"  Hamstring Stretch Standing foot on step 3x30"      NP  BOSU lunges 3 x 10 per side  Leg press 3 x 10 #, SL 70# NT  Bridge on SB with ham curl 3x10  Pilates Box 2x10 R  SLS Rebounder 3x10 on blue foam pad    Tito received the following manual therapy techniques: Joint mobilizations and Soft tissue Mobilization were applied to the: for 0 minutes.   Hip mobilization with belt  PROM     Written Home Exercises Provided: Yes  Pt demo good understanding of the education provided. Tito demonstrated good return demonstration of activities.     Education provided:  Tito verbalized good understanding of education provided.   No spiritual or educational barriers to learning identified.    Assessment     Pt was extremely limited in mobility and reports that he has not been performing HEP.  He has developed B knee pain. We discussed need to PT 2x per week. Due to long commute, I have recommended pt transfer to closer facility. " Will discuss with MD.     This is a 17 y.o. male referred to outpatient physical therapy and presents with a medical diagnosis of s/p hip l abral repair and demonstrates limitations as described in the problem list Pt prognosis is Excellent. Pt will continue to benefit from skilled outpatient physical therapy to address the deficits listed below in the problem list, provide pt/family education and to maximize pt's level of independence in the home and community environment.    Will the patient continue to benefit from skilled PT intervention? Yes        Medical necessity is demonstrated by:   - Pain limits function of effected part for all activities  - Unable to participate in daily activities   - Requires skilled supervision to complete and progress HEP  - Fall risk - impaired balance   - Continued inability to participate in vocational pursuits    Progress towards goals:     New/Revised Goals:       Plan   Continue with established Plan of Care towards PT goals.    Aissatou Madison, PT, DPT  04/26/2018

## 2018-05-08 ENCOUNTER — TELEPHONE (OUTPATIENT)
Dept: SPORTS MEDICINE | Facility: CLINIC | Age: 18
End: 2018-05-08

## 2018-05-08 DIAGNOSIS — Z98.890 STATUS POST ARTHROSCOPY OF HIP: Primary | ICD-10-CM

## 2018-05-08 NOTE — TELEPHONE ENCOUNTER
----- Message from Savanna Stevens sent at 5/8/2018 10:37 AM CDT -----  Contact: [email or web address] access#[phone]  Patient Requesting Referral    Who Called:lilly  Order Needed:Rehab Physical therapy Orders  Communication Preference (Karlo response to Pt. (or) Call Back # and timeframe):  Additional Information:Call Back#

## 2018-06-23 NOTE — TELEPHONE ENCOUNTER
I spoke with Ashtyn and informed her that I would send the information over to their office at [phone]   DISPLAY PLAN FREE TEXT

## (undated) DEVICE — DRAPE STERI INSTRUMENT 1018

## (undated) DEVICE — SOL IRR NACL .9% 3000ML

## (undated) DEVICE — CANNULA TRIM IT CUS HIP

## (undated) DEVICE — GLOVE BIOGEL SKINSENSE PI 7.0

## (undated) DEVICE — BLADE SHAVER ARTHRO 4.2X19CM

## (undated) DEVICE — WRAPON POLAR PAD HIP FOR POLAR

## (undated) DEVICE — GAUZE SPONGE 4X4 12PLY

## (undated) DEVICE — CONTAINER SPECIMEN STRL 4OZ

## (undated) DEVICE — SEE MEDLINE ITEM 152622

## (undated) DEVICE — KIT DISP HIP SUTURE TAK 3MMX14

## (undated) DEVICE — TUBE SET INFLOW/OUTFLOW

## (undated) DEVICE — HDS DISPOSABLE PAD KIT

## (undated) DEVICE — GOWN SMART IMP BREATHABLE XXLG

## (undated) DEVICE — PAD ELECTRODE STER 1.5X3

## (undated) DEVICE — APPLICATOR CHLORAPREP ORN 26ML

## (undated) DEVICE — TAPE SURG MEDIPORE 6X72IN

## (undated) DEVICE — POSITIONER IV ARMBOARD FOAM

## (undated) DEVICE — BLADE SHAVER PREBENT 4.2MM

## (undated) DEVICE — SHAVER BUR PEAR 6MMX19CM

## (undated) DEVICE — SUT ETHILON 3-0 PS2 18 BLK

## (undated) DEVICE — STICK SWITCHING HIP PRESRVTN

## (undated) DEVICE — ELECTRODE REM PLYHSV RETURN 9

## (undated) DEVICE — GLOVE ORTHO PF SZ 8.5

## (undated) DEVICE — SOL 9P NACL IRR PIC IL

## (undated) DEVICE — BRACE T-SCOPE HIP SMALL RIGHT

## (undated) DEVICE — PACK HIP ARTHROSCOPY CUSTOM

## (undated) DEVICE — UNDERGLOVES BIOGEL PI SIZE 7.5

## (undated) DEVICE — DRAPE XRAY EQUIPMENT UNIV

## (undated) DEVICE — CANNULA 7MM X 110MM

## (undated) DEVICE — SLINGSHOT 70DEG UP

## (undated) DEVICE — GLOVE SURGEON SYN PF SZ 9

## (undated) DEVICE — PROBE MULTIVAC 50 XL

## (undated) DEVICE — PAD ABD 8X10 STERILE

## (undated) DEVICE — PROBE ARTHSCP EDGE ENERGY 50

## (undated) DEVICE — DRESSING XEROFORM FOIL PK 1X8

## (undated) DEVICE — BLADE BANANA 279MM 11IN DISP

## (undated) DEVICE — SUT BLU BR 2 TAPERD NDL 1/2

## (undated) DEVICE — DRAPE STERI-DRAPE 1000 17X11IN